# Patient Record
Sex: FEMALE | Race: WHITE | NOT HISPANIC OR LATINO | URBAN - METROPOLITAN AREA
[De-identification: names, ages, dates, MRNs, and addresses within clinical notes are randomized per-mention and may not be internally consistent; named-entity substitution may affect disease eponyms.]

---

## 2017-11-03 ENCOUNTER — EMERGENCY (EMERGENCY)
Facility: HOSPITAL | Age: 22
LOS: 1 days | Discharge: HOME | End: 2017-11-03
Attending: EMERGENCY MEDICINE | Admitting: EMERGENCY MEDICINE
Payer: COMMERCIAL

## 2017-11-03 VITALS
RESPIRATION RATE: 18 BRPM | WEIGHT: 110.01 LBS | TEMPERATURE: 98 F | SYSTOLIC BLOOD PRESSURE: 133 MMHG | DIASTOLIC BLOOD PRESSURE: 88 MMHG | OXYGEN SATURATION: 99 % | HEART RATE: 72 BPM

## 2017-11-03 PROCEDURE — 99282 EMERGENCY DEPT VISIT SF MDM: CPT

## 2017-11-03 PROCEDURE — 99283 EMERGENCY DEPT VISIT LOW MDM: CPT

## 2017-11-03 NOTE — ED PROVIDER NOTE - OBJECTIVE STATEMENT
21 Yo F with no pmhx  presents 21 Yo F with no pmhx  presents with numbness since 2 pm on r. side of face. Describes similar episode a week ago, lasted 1 h. Denies any weakness, vision problems, headache, hx of migraines or multiple sclerosis, stroke, seizure disorder, difficulty speaking/eating, belly pain, n/v/d ir dysuria. NKDA. Denies smoking,  IVDA. social drinker. Works in finance.

## 2017-11-03 NOTE — ED ADULT TRIAGE NOTE - ARRIVAL INFO ADDITIONAL COMMENTS
No facial drooping or slurred speech noted. Strength equal bilaterally. Patient denies any weakness.

## 2017-11-03 NOTE — ED PROVIDER NOTE - CARE PLAN
Principal Discharge DX:	Numbness  Instructions for follow-up, activity and diet:	Evaluated for numbness, you nl neuro exam and vital signs. You have minimal numbness over forehead(trigeminal nerve), please f/u with your PCP and Neurologist - number given for outpatient evaluation and possible imaging - MRI

## 2017-11-03 NOTE — ED ADULT NURSE NOTE - CHPI ED SYMPTOMS NEG
no dizziness/no weakness/no blurred vision/no loss of consciousness/no change in level of consciousness/no confusion/no vomiting

## 2017-11-03 NOTE — ED PROVIDER NOTE - PLAN OF CARE
Evaluated for numbness, you nl neuro exam and vital signs. You have minimal numbness over forehead(trigeminal nerve), please f/u with your PCP and Neurologist - number given for outpatient evaluation and possible imaging - MRI

## 2017-11-03 NOTE — ED ADULT NURSE NOTE - OBJECTIVE STATEMENT
states numbness to right side of face since 1400 today.  states its happened over the weekend when she was laying down and it went away on its own.  speaking in full clear sentences. facial symmetry noted.  CSS (-).  denies chest pain, palpitations, sob.  ambulates with steady gait

## 2017-11-03 NOTE — ED PROVIDER NOTE - PROGRESS NOTE DETAILS
Minimal numbness over R. face(V1) area. Nl neuro exam otherwise. Told to f/u with her pcp and neurologist as an outpatient for possible imaging -?mri.

## 2017-11-03 NOTE — ED PROVIDER NOTE - ATTENDING CONTRIBUTION TO CARE
here with right facial numbness.  no other neuro symptoms.  had similar a week ago resolved on own.  recurred today and intermittent intensity but persistent.  CN 2-12 intact, finger to nose normal, speech normal, gait normal.  discussed possible ms, normal exam otherwise, red flag neuro findings which she does not have, and need for close outpatient f/u for further eval, possible mri.  agreeable, will return if anything worsens

## 2017-11-03 NOTE — ED PROVIDER NOTE - MEDICAL DECISION MAKING DETAILS
Evaluated for numbness, you nl neuro exam and vital signs. You have minimal numbness over forehead(trigeminal nerve), please f/u with your PCP and Neurologist - number given for outpatient evaluation

## 2017-11-03 NOTE — ED ADULT TRIAGE NOTE - CHIEF COMPLAINT QUOTE
"On Saturday I was cuddling with someone and the right side of my face and neck went numb and then it went away but today it came back again for 4 hours."

## 2017-11-09 DIAGNOSIS — R20.0 ANESTHESIA OF SKIN: ICD-10-CM

## 2017-11-09 DIAGNOSIS — Z88.3 ALLERGY STATUS TO OTHER ANTI-INFECTIVE AGENTS: ICD-10-CM

## 2020-11-07 NOTE — ED PROVIDER NOTE - CONSTITUTIONAL NEGATIVE STATEMENT, MLM
Physical Therapy  Visit Type: initial evaluation  Treatment diagnosis: Weakness, impaired gait  Precautions:  Medical precautions: ; standard precautions.   Lines:     Basic: telemetry, urinary catheter and IV      SUBJECTIVE                                                                                                            Patient agreed to participate in therapy this date.  Pt is agreeable to participate in session. \"I'm glad you are here. I wanted to be seen by therapy.\"Patient has been hospitalized or in a skilled nursing facility in the last 30 days.  Patient / Family Goal: return to previous functional status      OBJECTIVE                                                                                                                Oriented to appropriate for developmental age     Arousal alertness: appropriate responses to stimuli    Affect/Behavior: cooperative  Patient activity tolerance: 1 to 1 activity to rest  Range of Motion (measured in degrees unless otherwise noted, active unless indicated)  WFL: RLE, LLE  Strength (out of 5 unless otherwise indicated)   WFL: LLE, RLE  Balance    Sitting: Static: supervision, Dynamic: minimal assist    Standing - Firm Surface - Eyes Open: Static: minimal assist Dynamic: minimal assist  Bed Mobility:      Supine to sit: supervision  Training completed:      Education details: patient requires additional training    Supervision to assess sequencing supine to sit  Transfers:    Assistive devices: straight cane    Sit to stand: minimal assist    Stand to sit: minimal assist    Stand pivot: minimal assist  Training completed:      Education details: patient requires additional training    Min assist of 1 at trunk for balance and safety sit to stand and reverse from bed x 1, recliner x 1  Gait/Ambulation:     Assistance: minimal assist   Assistive device: straight cane    Distance (ft): 4; 40    Type: decreased karoline  Training Completed:    Tasks: gait training  on level surfaces    Education details: patient requires additional training    Pt ambulated 4ft x 1, 40ft x 1 with cane and min assist of 1 at trunk for balance and safety. Slow yet steady karoline noted               ASSESSMENT                                                                                                                Impairments: activity tolerance  Functional Limitations: all functional mobility  Pt reports living with a roommate in a home with 1 + 1 steps to enter. Pt tends to reach out to furniture in room for UE support. Uses cane for ambulation in community. Pt admitted due to SOB, chest discomfort. Pt reports knee buckling/weakness, needing to be lowered onto couch while with EMT prior to admit. Pt recently hospitalized at [facility] for SBO. Today pt is supervision for supine to sit bed mobility as well as static sitting balance. Min assist for transfers from bed x 1, recliner x 1, static standing balance as well as ambulating 4ft, 40ft with cane for UE support. Slow yet steady karoline noted. Fair overall activity tolerance. Pt is below baseline and will continue to benefit from skilled acute PT to advance all areas of mobility along with overall activity tolerance. Collaborated with RN. Plan to continue efforts.     Discharge Recommendations  Recommendation for Discharge: PT WI: Home, Home therapy(pending needs, MD recommendation)         PT/OT Mobility Equipment for Discharge: Owns cane. Will continue to monitor needs      PT Identified Barriers to Discharge: medical, fall risk                        Skilled therapy is required to address these limitations in attempt to maximize the patient's independence.  Progress: improving as expected  Predicted patient presentation: Moderate (evolving) - Patient comorbidities and complexities, as defined above, may have varying impact on steady progress for prescribed plan of care.    End of Session:   Location: in chair  Safety measures: call light  within reach and lines intact            PLAN                                                                                                                            Suggestions for next session as indicated: Progress bed mobility, transfers, ambulation with cane, stairs(1+1 to enter home)  PT Frequency: 5 days/week  Frequency Comments: X, M-F    Interventions: bed mobility, functional transfer training, gait training and stairs retraining  Agreement to plan and goals: patient agrees with goals and treatment plan        GOALS:  Review Date: 11/9/2020  Long Term Goals: (to be met by time of discharge from hospital)  Sit to supine: Patient will complete sit to supine modified independent.  Supine to sit: Patient will complete supine to sit modified independent.  Sit to stand: Patient will complete sit to stand transfer with single point cane, modified independent.   Stand to sit: Patient will complete stand to sit transfer with single point cane, modified independent.   Ambulation (even): Patient will ambulate on even surface for 150 feet with single point cane.   Curb step: Patient will ambulate curb step with single point cane, modified independent.     Documented in the chart in the following areas: Prior Level of Function. Pain. Assessment. Patient Education.      Admitting diagnosis: Shortness of breath (R06.02);Chest pain (R07.9);Bilateral leg weakness (R29.898);Chronic bilateral low back pain with bilateral sciatica (M54.42, M54.41, G89.29);Chest pain, unspecified type (R07.9)    Co-morbidities and problem list:   Patient Active Problem List:   Attention deficit disorder without mention of hyperactivity   Dysthymic disorder   S/P repair of recurrent ventral hernia   Abdominal pain, generalized   RUQ abdominal pain   Ileus (CMS/HCC)   Partial small bowel obstruction (CMS/HCC)   SBO (small bowel obstruction) (CMS/HCC)   Bipolar I disorder, most recent episode (or current) mixed, severe, without mention of  psychotic behavior   Cervical stenosis of spine   Cervical spondylosis with radiculopathy   Rectal bleed   Generalized anxiety disorder   Attention deficit hyperactivity disorder, inattentive type   Severe depressed bipolar I disorder without psychotic features (CMS/[facility])    Treatment Diagnosis: Weakness, impaired gait    The referring provider's electronic signature on the evaluation authorizes the therapy plan of care and certifies the need for these services, furnished under this plan of care while under their care.         no fever and no chills.